# Patient Record
Sex: FEMALE | Race: WHITE | NOT HISPANIC OR LATINO | Employment: FULL TIME | URBAN - METROPOLITAN AREA
[De-identification: names, ages, dates, MRNs, and addresses within clinical notes are randomized per-mention and may not be internally consistent; named-entity substitution may affect disease eponyms.]

---

## 2017-02-28 ENCOUNTER — ALLSCRIPTS OFFICE VISIT (OUTPATIENT)
Dept: OTHER | Facility: OTHER | Age: 47
End: 2017-02-28

## 2017-02-28 DIAGNOSIS — E03.9 HYPOTHYROIDISM: ICD-10-CM

## 2017-02-28 DIAGNOSIS — F41.9 ANXIETY DISORDER: ICD-10-CM

## 2017-02-28 DIAGNOSIS — F32.9 MAJOR DEPRESSIVE DISORDER, SINGLE EPISODE: ICD-10-CM

## 2017-07-27 ENCOUNTER — TRANSCRIBE ORDERS (OUTPATIENT)
Dept: LAB | Facility: CLINIC | Age: 47
End: 2017-07-27

## 2017-07-27 ENCOUNTER — APPOINTMENT (OUTPATIENT)
Dept: LAB | Facility: CLINIC | Age: 47
End: 2017-07-27
Payer: COMMERCIAL

## 2017-07-27 DIAGNOSIS — E03.9 HYPOTHYROIDISM: ICD-10-CM

## 2017-07-27 DIAGNOSIS — F32.9 MAJOR DEPRESSIVE DISORDER, SINGLE EPISODE: ICD-10-CM

## 2017-07-27 DIAGNOSIS — F41.9 ANXIETY DISORDER: ICD-10-CM

## 2017-07-27 LAB
ALBUMIN SERPL BCP-MCNC: 3.7 G/DL (ref 3.5–5)
ALP SERPL-CCNC: 80 U/L (ref 46–116)
ALT SERPL W P-5'-P-CCNC: 30 U/L (ref 12–78)
ANION GAP SERPL CALCULATED.3IONS-SCNC: 6 MMOL/L (ref 4–13)
AST SERPL W P-5'-P-CCNC: 19 U/L (ref 5–45)
BILIRUB SERPL-MCNC: 0.7 MG/DL (ref 0.2–1)
BUN SERPL-MCNC: 14 MG/DL (ref 5–25)
CALCIUM SERPL-MCNC: 8.9 MG/DL (ref 8.3–10.1)
CHLORIDE SERPL-SCNC: 103 MMOL/L (ref 100–108)
CHOLEST SERPL-MCNC: 227 MG/DL (ref 50–200)
CO2 SERPL-SCNC: 28 MMOL/L (ref 21–32)
CREAT SERPL-MCNC: 0.67 MG/DL (ref 0.6–1.3)
GFR SERPL CREATININE-BSD FRML MDRD: 104 ML/MIN/1.73SQ M
GLUCOSE P FAST SERPL-MCNC: 110 MG/DL (ref 65–99)
HDLC SERPL-MCNC: 43 MG/DL (ref 40–60)
LDLC SERPL CALC-MCNC: 149 MG/DL (ref 0–100)
POTASSIUM SERPL-SCNC: 4 MMOL/L (ref 3.5–5.3)
PROT SERPL-MCNC: 7.6 G/DL (ref 6.4–8.2)
SODIUM SERPL-SCNC: 137 MMOL/L (ref 136–145)
T4 FREE SERPL-MCNC: 1.1 NG/DL (ref 0.76–1.46)
TRIGL SERPL-MCNC: 174 MG/DL
TSH SERPL DL<=0.05 MIU/L-ACNC: 5.56 UIU/ML (ref 0.36–3.74)

## 2017-07-27 PROCEDURE — 80061 LIPID PANEL: CPT

## 2017-07-27 PROCEDURE — 80053 COMPREHEN METABOLIC PANEL: CPT

## 2017-07-27 PROCEDURE — 36415 COLL VENOUS BLD VENIPUNCTURE: CPT

## 2017-07-27 PROCEDURE — 84439 ASSAY OF FREE THYROXINE: CPT

## 2017-07-27 PROCEDURE — 84443 ASSAY THYROID STIM HORMONE: CPT

## 2017-08-30 ENCOUNTER — GENERIC CONVERSION - ENCOUNTER (OUTPATIENT)
Dept: OTHER | Facility: OTHER | Age: 47
End: 2017-08-30

## 2017-08-30 ENCOUNTER — ALLSCRIPTS OFFICE VISIT (OUTPATIENT)
Dept: OTHER | Facility: OTHER | Age: 47
End: 2017-08-30

## 2017-08-30 DIAGNOSIS — E03.9 HYPOTHYROIDISM: ICD-10-CM

## 2017-08-30 DIAGNOSIS — R73.9 HYPERGLYCEMIA: ICD-10-CM

## 2017-11-22 ENCOUNTER — ALLSCRIPTS OFFICE VISIT (OUTPATIENT)
Dept: OTHER | Facility: OTHER | Age: 47
End: 2017-11-22

## 2017-12-12 ENCOUNTER — GENERIC CONVERSION - ENCOUNTER (OUTPATIENT)
Dept: OTHER | Facility: OTHER | Age: 47
End: 2017-12-12

## 2018-01-09 NOTE — PROGRESS NOTES
Chief Complaint  Pt here for PPD placement      History of Present Illness  HPI: ppd applied      Active Problems    1  Anxiety and depression (300 00,311) (F41 9,F32 9)   2  Fatigue (780 79) (R53 83)   3  Heel pain (729 5) (M79 673)   4  Hypothyroidism (244 9) (E03 9)   5  Muscle ache (729 1) (M79 1)    Current Meds   1  Levothyroxine Sodium 75 MCG Oral Tablet; TAKE 1 TABLET DAILY; Therapy: 03LIL0020 to (Evaluate:[id number])  Requested for: 17Aug2016; Last   Rx:17Aug2016 Ordered   2  Venlafaxine HCl - 37 5 MG Oral Tablet; TAKE 1 TABLET BY MOUTH TWICE A DAY WITH   MEALS; Therapy: [id number] to (Evaluate:65Tcp2916)  Requested for: 17Aug2016; Last   Rx:17Aug2016 Ordered    Allergies    1  Ceclor CAPS   2   Amoxicillin CAPS    Vitals  Signs    Temperature: 97 4 F, Tympanic    Plan  Encounter for PPD test    · PPD    Signatures   Electronically signed by : Nikki Wade DO; Aug 29 2016  8:50PM EST                       (Author)

## 2018-01-13 VITALS
DIASTOLIC BLOOD PRESSURE: 82 MMHG | SYSTOLIC BLOOD PRESSURE: 120 MMHG | WEIGHT: 193 LBS | HEART RATE: 85 BPM | BODY MASS INDEX: 32.95 KG/M2 | TEMPERATURE: 98.8 F | OXYGEN SATURATION: 97 % | HEIGHT: 64 IN | RESPIRATION RATE: 16 BRPM

## 2018-01-13 NOTE — MISCELLANEOUS
Message  called and told her we have her titers  she has immunity except for measles  ? should we order vaccine  ?can she get it at work  also her son's school form is here  ?did he not come back to have his ppd read  please call us back today and let us know        Signatures   Electronically signed by : Nicole Whitfield DO; Sep 28 2016  8:10AM EST                       (Author)

## 2018-01-14 VITALS
DIASTOLIC BLOOD PRESSURE: 74 MMHG | HEART RATE: 80 BPM | WEIGHT: 193.25 LBS | TEMPERATURE: 97.5 F | SYSTOLIC BLOOD PRESSURE: 110 MMHG | HEIGHT: 64 IN | BODY MASS INDEX: 32.99 KG/M2

## 2018-01-14 VITALS
WEIGHT: 193 LBS | DIASTOLIC BLOOD PRESSURE: 76 MMHG | HEART RATE: 64 BPM | SYSTOLIC BLOOD PRESSURE: 102 MMHG | BODY MASS INDEX: 32.95 KG/M2 | HEIGHT: 64 IN | TEMPERATURE: 99 F

## 2018-01-24 VITALS
HEIGHT: 64 IN | TEMPERATURE: 98.3 F | SYSTOLIC BLOOD PRESSURE: 122 MMHG | WEIGHT: 193 LBS | BODY MASS INDEX: 32.95 KG/M2 | DIASTOLIC BLOOD PRESSURE: 78 MMHG | HEART RATE: 68 BPM

## 2018-05-08 ENCOUNTER — OFFICE VISIT (OUTPATIENT)
Dept: INTERNAL MEDICINE CLINIC | Age: 48
End: 2018-05-08
Payer: COMMERCIAL

## 2018-05-08 VITALS
WEIGHT: 195.2 LBS | TEMPERATURE: 98.6 F | HEART RATE: 68 BPM | HEIGHT: 63 IN | SYSTOLIC BLOOD PRESSURE: 110 MMHG | DIASTOLIC BLOOD PRESSURE: 74 MMHG | BODY MASS INDEX: 34.59 KG/M2

## 2018-05-08 DIAGNOSIS — E03.9 ACQUIRED HYPOTHYROIDISM: Primary | ICD-10-CM

## 2018-05-08 DIAGNOSIS — R73.9 HYPERGLYCEMIA: ICD-10-CM

## 2018-05-08 DIAGNOSIS — M25.571 ACUTE RIGHT ANKLE PAIN: ICD-10-CM

## 2018-05-08 DIAGNOSIS — F41.8 MIXED ANXIETY DEPRESSIVE DISORDER: ICD-10-CM

## 2018-05-08 DIAGNOSIS — J30.1 SEASONAL ALLERGIC RHINITIS DUE TO POLLEN: ICD-10-CM

## 2018-05-08 PROCEDURE — 3008F BODY MASS INDEX DOCD: CPT | Performed by: INTERNAL MEDICINE

## 2018-05-08 PROCEDURE — 99214 OFFICE O/P EST MOD 30 MIN: CPT | Performed by: INTERNAL MEDICINE

## 2018-05-08 RX ORDER — LEVOTHYROXINE SODIUM 0.1 MG/1
TABLET ORAL
COMMUNITY
Start: 2018-02-03 | End: 2018-05-08 | Stop reason: SDUPTHER

## 2018-05-08 RX ORDER — KETOTIFEN FUMARATE 0.35 MG/ML
1 SOLUTION/ DROPS OPHTHALMIC EVERY 12 HOURS PRN
COMMUNITY
Start: 2017-12-12

## 2018-05-08 RX ORDER — FEXOFENADINE HCL 180 MG/1
180 TABLET ORAL
COMMUNITY
End: 2018-05-08 | Stop reason: SDUPTHER

## 2018-05-08 RX ORDER — FEXOFENADINE HCL 180 MG/1
180 TABLET ORAL DAILY
Qty: 100 TABLET | Refills: 3 | Status: SHIPPED | OUTPATIENT
Start: 2018-05-08

## 2018-05-08 RX ORDER — VENLAFAXINE 37.5 MG/1
TABLET ORAL
COMMUNITY
Start: 2018-01-03 | End: 2018-05-08 | Stop reason: SDUPTHER

## 2018-05-08 RX ORDER — LEVOTHYROXINE SODIUM 0.1 MG/1
100 TABLET ORAL DAILY
Qty: 90 TABLET | Refills: 3 | Status: SHIPPED | OUTPATIENT
Start: 2018-05-08 | End: 2018-10-18 | Stop reason: SDUPTHER

## 2018-05-08 RX ORDER — VENLAFAXINE 37.5 MG/1
37.5 TABLET ORAL 2 TIMES DAILY
Qty: 180 TABLET | Refills: 3 | Status: SHIPPED | OUTPATIENT
Start: 2018-05-08 | End: 2018-10-18 | Stop reason: SDUPTHER

## 2018-05-09 PROBLEM — M25.571 ACUTE RIGHT ANKLE PAIN: Status: ACTIVE | Noted: 2018-05-09

## 2018-05-09 NOTE — ASSESSMENT & PLAN NOTE
Over the last several weeks patient has noted increasing swelling and some discomfort of her right ankle  She denies trauma    Will check an x-ray

## 2018-05-09 NOTE — PROGRESS NOTES
Assessment/Plan:    Hypothyroidism  Patient is a long-term hypothyroidism and needs refills and blood work    Mixed anxiety depressive disorder  Patient continues to feel well on Effexor and needs refills    Hyperglycemia  Patient 1 minimally elevated blood sugar last year and never went for repeat studies  She has no symptoms but will add an A1c to her follow-up blood work    Acute right ankle pain  Over the last several weeks patient has noted increasing swelling and some discomfort of her right ankle  She denies trauma  Will check an x-ray       Diagnoses and all orders for this visit:    Acquired hypothyroidism  -     levothyroxine 100 mcg tablet; Take 1 tablet (100 mcg total) by mouth daily    Mixed anxiety depressive disorder  -     venlafaxine (EFFEXOR) 37 5 mg tablet; Take 1 tablet (37 5 mg total) by mouth 2 (two) times a day    Acute right ankle pain  -     XR ankle 3+ vw right; Future    Hyperglycemia    Seasonal allergic rhinitis due to pollen  -     fexofenadine (ALLEGRA) 180 MG tablet; Take 1 tablet (180 mg total) by mouth daily    Other orders  -     ketotifen (ALAWAY) 0 025 % ophthalmic solution; Apply 1 drop to eye every 12 (twelve) hours as needed  -     Discontinue: fexofenadine (ALLEGRA) 180 MG tablet; Take 180 mg by mouth  -     Discontinue: levothyroxine 100 mcg tablet;   -     Discontinue: venlafaxine (EFFEXOR) 37 5 mg tablet;           Subjective:      Patient ID: Jadiel Kern is a 50 y o  female  Thyroid Problem   Presents for follow-up visit  Patient reports no cold intolerance, constipation, diarrhea, fatigue or palpitations  (Currently asymptomatic) The symptoms have been resolved  Her past medical history is significant for diabetes  Blood Sugar Problem   This is a new problem  The current episode started more than 1 month ago  Associated symptoms include joint swelling (Right ankle swelling)   Pertinent negatives include no abdominal pain, arthralgias, chest pain, chills, congestion, coughing, fatigue, fever, headaches, myalgias, nausea, neck pain, numbness, rash, sore throat or weakness  Associated symptoms comments: She has no symptoms of same  She has tried nothing for the symptoms  Ankle Pain    The incident occurred more than 1 week ago  There was no injury mechanism  The pain is present in the right ankle  The quality of the pain is described as shooting  The pain is moderate  Pertinent negatives include no numbness  Associated symptoms comments: swelling  The symptoms are aggravated by weight bearing and movement  She has tried acetaminophen and NSAIDs for the symptoms  The treatment provided mild relief  Depression   This is a chronic problem  The current episode started more than 1 year ago  The problem occurs constantly  The problem has been resolved  Associated symptoms include joint swelling (Right ankle swelling)  Pertinent negatives include no abdominal pain, arthralgias, chest pain, chills, congestion, coughing, fatigue, fever, headaches, myalgias, nausea, neck pain, numbness, rash, sore throat or weakness  Treatments tried: effexor  The treatment provided significant relief  Review of Systems   Constitutional: Negative for chills, fatigue, fever and unexpected weight change  HENT: Negative for congestion, ear pain, hearing loss, postnasal drip, sinus pressure, sore throat, trouble swallowing and voice change  Eyes: Negative for visual disturbance  Respiratory: Negative for cough, chest tightness, shortness of breath and wheezing  Cardiovascular: Negative for chest pain, palpitations and leg swelling  Gastrointestinal: Negative for abdominal distention, abdominal pain, anal bleeding, blood in stool, constipation, diarrhea and nausea  Endocrine: Negative for cold intolerance, polydipsia, polyphagia and polyuria  Genitourinary: Negative for dysuria, flank pain, frequency, hematuria and urgency     Musculoskeletal: Positive for joint swelling (Right ankle swelling)  Negative for arthralgias, back pain, gait problem, myalgias and neck pain  Skin: Negative for rash  Allergic/Immunologic: Negative for immunocompromised state  Neurological: Negative for dizziness, syncope, facial asymmetry, weakness, light-headedness, numbness and headaches  Hematological: Negative for adenopathy  Psychiatric/Behavioral: Positive for depression  Negative for confusion, sleep disturbance and suicidal ideas  Objective:      /74 (BP Location: Left arm, Patient Position: Sitting)   Pulse 68   Temp 98 6 °F (37 °C) (Tympanic)   Ht 5' 3" (1 6 m)   Wt 88 5 kg (195 lb 3 2 oz)   BMI 34 58 kg/m²          Physical Exam   Constitutional: She is oriented to person, place, and time  She appears well-developed and well-nourished  No distress  Obese   HENT:   Right Ear: External ear normal    Left Ear: External ear normal    Nose: Nose normal    Mouth/Throat: Oropharynx is clear and moist  No oropharyngeal exudate  Eyes: EOM are normal  Pupils are equal, round, and reactive to light  Neck: Normal range of motion  Neck supple  No JVD present  No thyromegaly present  Cardiovascular: Normal rate, regular rhythm, normal heart sounds and intact distal pulses  Exam reveals no gallop  No murmur heard  Pulmonary/Chest: Effort normal and breath sounds normal  No respiratory distress  She has no wheezes  She has no rales  Abdominal: Soft  Bowel sounds are normal  She exhibits no distension and no mass  There is no tenderness  Musculoskeletal: Normal range of motion  She exhibits edema  She exhibits no tenderness  Swollen right ankle   Lymphadenopathy:     She has no cervical adenopathy  Neurological: She is alert and oriented to person, place, and time  No cranial nerve deficit  Coordination normal    Skin: No rash noted  Psychiatric: She has a normal mood and affect   Her behavior is normal  Judgment and thought content normal

## 2018-05-09 NOTE — ASSESSMENT & PLAN NOTE
Patient 1 minimally elevated blood sugar last year and never went for repeat studies    She has no symptoms but will add an A1c to her follow-up blood work

## 2018-05-09 NOTE — PATIENT INSTRUCTIONS
Depression   AMBULATORY CARE:   Depression  is a medical condition that causes feelings of sadness or hopelessness that do not go away  Depression may cause you to lose interest in things you used to enjoy  These feelings may interfere with your daily life  Common symptoms include the following:   · Appetite changes, or weight gain or loss    · Trouble going to sleep or staying asleep, or sleeping too much    · Fatigue or lack of energy    · Feeling restless, irritable, or withdrawn    · Feeling worthless, hopeless, discouraged, or guilty    · Trouble concentrating, remembering things, doing daily tasks, or making decisions    · Thoughts about hurting or killing yourself  Call 911 for any of the following:   · You think about harming yourself or someone else  Contact your healthcare provider if:   · Your symptoms do not improve  · You cannot make it to your next appointment  · You have new symptoms  · You have questions or concerns about your condition or care  Treatment for depression  may include medicine to improve or balance your mood  Therapy may also be used to treat your depression  A therapist will help you learn to cope with your thoughts and feelings  Therapy can be done alone or in a group  It may also be done with family members or a significant other  Self-care:   · Get regular physical activity  Try to exercise for 30 minutes, 3 to 5 days a week  Work with your healthcare provider to develop an exercise plan that you enjoy  Physical activity may improve your symptoms  · Get enough sleep  Create a routine to help you relax before bed  You can listen to music, read, or do yoga  Try to go to bed and wake up at the same time every day  Sleep is important for emotional health  · Eat a variety of healthy foods from all of the food groups  A healthy meal plan is low in fat, salt, and added sugar   Ask your healthcare provider for more information about a meal plan that is right for you      · Do not drink alcohol or use drugs  Alcohol and drugs can make your symptoms worse  Follow up with your healthcare provider as directed: Your healthcare provider will monitor your progress at follow-up visits  He or she will also monitor your medicine if you take antidepressants  Your healthcare provider will ask if the medicine is helping  Tell him or her about any side effects or problems you may have with your medicine  The type or amount of medicine may need to be changed  Write down your questions so you remember to ask them during your visits  © 2017 [address] [employer] Information is for End User's use only and may not be sold, redistributed or otherwise used for commercial purposes  All illustrations and images included in CareNotes® are the copyrighted property of [employer]  or [employer]nel [employer]  The above information is an  only  It is not intended as medical advice for individual conditions or treatments  Talk to your doctor, nurse or pharmacist before following any medical regimen to see if it is safe and effective for you

## 2018-05-14 ENCOUNTER — DOCUMENTATION (OUTPATIENT)
Dept: INTERNAL MEDICINE CLINIC | Age: 48
End: 2018-05-14

## 2018-05-14 LAB — HBA1C MFR BLD HPLC: 5.9 %

## 2018-10-18 DIAGNOSIS — F41.8 MIXED ANXIETY DEPRESSIVE DISORDER: ICD-10-CM

## 2018-10-18 DIAGNOSIS — E03.9 ACQUIRED HYPOTHYROIDISM: ICD-10-CM

## 2018-10-19 RX ORDER — LEVOTHYROXINE SODIUM 0.1 MG/1
100 TABLET ORAL DAILY
Qty: 90 TABLET | Refills: 5 | Status: SHIPPED | OUTPATIENT
Start: 2018-10-19

## 2018-10-19 RX ORDER — VENLAFAXINE 37.5 MG/1
TABLET ORAL
Qty: 180 TABLET | Refills: 3 | Status: SHIPPED | OUTPATIENT
Start: 2018-10-19

## 2021-10-07 NOTE — PROGRESS NOTES
Assessment    1  Acute pain of right knee (539 46) (M25 561)  2  Shoulder pain, right (719 41) (M25 511)    Plan  Acute pain of right knee    · Apply an ice pack 4 times a day for 20 minutes the first 2 days ; Status:Complete;   Done:73Hvv3235 03:55PM   · Put an elastic bandage on your knee for 12 hours a day for 1 week ; Status:Complete;  Done: 24QBR2120 03:55PM   · We would like you to start exercises to build muscle strength and keep your joints loose  ;Status:Complete;   Done: 05KOU9676 03:55PM  Acute pain of right knee, Shoulder pain, right    · Start: Naproxen 500 MG Oral Tablet (Naprosyn); TAKE 1 TABLET Twice daily PRN    Discussion/Summary    Gave exercises for RTC injury  If no better will refer to PT or ortho  The patient was counseled regarding instructions for management,-- risk factor reductions,-- prognosis,-- patient and family education,-- impressions,-- risks and benefits of treatment options,-- importance of compliance with treatment  Possible side effects of new medications were reviewed with the patient/guardian today  The treatment plan was reviewed with the patient/guardian  The patient/guardian understands and agrees with the treatment plan      Chief Complaint  Pt here today with right shoulder pain times 1 week on and off and right knee pain times 2 to 3      History of Present Illness  HPI: She is a very active person but over the past few weeks she has had right knee pain and swelling and right shoulder pain   Shoulder Pain: The patient is being seen for an initial evaluation of shoulder pain  The patient is right hand dominant  Right shoulder injury,related to no specific trauma This occurred at home   Symptoms:  shoulder pain-- and-- shoulder stiffness, but-- no localized bruising,-- no localized swelling,-- no localized redness,-- no localized warmth,-- no localized numbness-- and-- no localized weakness--   The patient presents with complaints of frequent episodes of mild decreased range of motion at the shoulder  The patient is currently experiencing symptoms  No associated symptoms are reported  The patient is not currently being treated for this problem  The patient is currently able to do activities of daily living without limitations, able to work without limitations and able to participate in sports without limitations  This problem has not been previously evaluated  Knee Pain (Acute): The patient is being seen for an initial evaluation of this episode of knee pain  This condition is not related to a specific injury  The injury involved the right knee  This occurred 1 week(s) ago hiking  Symptoms: knee pain,-- knee swelling-- and-- knee stiffness, but-- no knee redness,-- no knee warmth,-- no knee bruising,-- no knee locking,-- no knee clicking,-- no knee instability,-- no limping,-- no refusal to bear weight-- and-- no decreased knee range of motion  Symptom Cluster Details: she reports the symptoms are improving  Associated symptoms:  no pain in other joints,-- no fever,-- no chills-- and-- no rash  Current Treatment: she is currently not being treated for this problem  Pertinent History: no pertinent medical history reported  Evaluation and Treatment History: she has not been previously evaluated for this problem  Review of Systems   Constitutional: No fever, no chills, feels well, no tiredness, no recent weight gain or loss  Gastrointestinal: no complaints of abdominal pain, no constipation, no nausea or diarrhea, no vomiting, no bloody stools  Musculoskeletal: as noted in HPI  Integumentary: no complaints of skin rash or lesion, no itching or dry skin, no skin wounds  Neurological: no complaints of headache, no confusion, no numbness or tingling, no dizziness or fainting  Active Problems  1  Anxiety and depression (300 00,311) (F41 8)  2  Hyperglycemia (790 29) (R73 9)  3   Hypothyroidism (244 9) (E03 9)    Past Medical History  Active Problems And Past Medical History Reviewed: The active problems and past medical history were reviewed and updated today  Social History     · Never a smoker   · Occasional alcohol use  The social history was reviewed and updated today  The social history was reviewed and is unchanged  Current Meds  1  Levothyroxine Sodium 100 MCG Oral Tablet; TAKE 1 TABLET DAILY AS DIRECTED; Therapy: [id number] to (Shavon Garner)  Requested for: 75KHN9349; Last Rx:03Nov2017 Ordered  2  Venlafaxine HCl - 37 5 MG Oral Tablet; TAKE 1 TABLET BY MOUTH TWICE A DAY WITH MEALS; Therapy: 01QLE3990 to (Evaluate:29Uww6829)  Requested for: 49YTI5975; Last Rx:12Dpc4055 Ordered    The medication list was reviewed and updated today  Allergies  1  Ceclor CAPS  2  Amoxicillin CAPS    Vitals   Recorded: 22Nov2017 02:35PM   Temperature 98 8 F, Tympanic   Heart Rate 85   Respiration 16   Systolic 076, LUE, Sitting   Diastolic 82, LUE, Sitting   Height 5 ft 4 in   Weight 193 lb    BMI Calculated 33 13   BSA Calculated 1 93   O2 Saturation 97       Physical Exam   Eyes  Conjunctiva and lids: No swelling, erythema or discharge  Pulmonary  Respiratory effort: No increased work of breathing or signs of respiratory distress  Auscultation of lungs: Clear to auscultation  Musculoskeletal  Gait and station: Normal    Digits and nails: Normal without clubbing or cyanosis  Inspection/palpation of joints, bones, and muscles: Abnormal  -- mild decreased ROM and stiffness of right shoulder, small effusion right knee, no instability  Skin  Skin and subcutaneous tissue: Normal without rashes or lesions  Neurologic  Cranial nerves: Cranial nerves 2-12 intact     Psychiatric  Orientation to person, place, and time: Normal          Signatures   Electronically signed by : ARMINDA Gonzalez ; Nov 22 2017  3:57PM EST                       (Author) No